# Patient Record
Sex: MALE | HISPANIC OR LATINO | Employment: UNEMPLOYED | URBAN - METROPOLITAN AREA
[De-identification: names, ages, dates, MRNs, and addresses within clinical notes are randomized per-mention and may not be internally consistent; named-entity substitution may affect disease eponyms.]

---

## 2023-09-23 ENCOUNTER — HOSPITAL ENCOUNTER (EMERGENCY)
Facility: HOSPITAL | Age: 33
Discharge: HOME | End: 2023-09-23
Attending: PEDIATRICS
Payer: MEDICARE

## 2023-09-23 ENCOUNTER — APPOINTMENT (OUTPATIENT)
Dept: GENERAL RADIOLOGY | Facility: HOSPITAL | Age: 33
End: 2023-09-23
Payer: MEDICARE

## 2023-09-23 VITALS
DIASTOLIC BLOOD PRESSURE: 74 MMHG | HEART RATE: 96 BPM | TEMPERATURE: 98.7 F | BODY MASS INDEX: 34.53 KG/M2 | SYSTOLIC BLOOD PRESSURE: 118 MMHG | RESPIRATION RATE: 16 BRPM | OXYGEN SATURATION: 95 % | WEIGHT: 220 LBS | HEIGHT: 67 IN

## 2023-09-23 DIAGNOSIS — L73.9 FOLLICULITIS: Primary | ICD-10-CM

## 2023-09-23 LAB
ALBUMIN SERPL-MCNC: 4.2 G/DL (ref 3.5–5.2)
ALBUMIN/GLOB SERPL: 1.2 G/DL
ALP SERPL-CCNC: 89 U/L (ref 39–117)
ALT SERPL W P-5'-P-CCNC: 22 U/L (ref 1–41)
ANION GAP SERPL CALCULATED.3IONS-SCNC: 9.8 MMOL/L (ref 5–15)
AST SERPL-CCNC: 14 U/L (ref 1–40)
BASOPHILS # BLD AUTO: 0.04 10*3/MM3 (ref 0–0.2)
BASOPHILS NFR BLD AUTO: 0.4 % (ref 0–1.5)
BILIRUB SERPL-MCNC: 0.3 MG/DL (ref 0–1.2)
BILIRUB UR QL STRIP: NEGATIVE
BUN SERPL-MCNC: 14 MG/DL (ref 6–20)
BUN/CREAT SERPL: 13.2 (ref 7–25)
CALCIUM SPEC-SCNC: 9.3 MG/DL (ref 8.6–10.5)
CHLORIDE SERPL-SCNC: 102 MMOL/L (ref 98–107)
CLARITY UR: CLEAR
CO2 SERPL-SCNC: 24.2 MMOL/L (ref 22–29)
COLOR UR: YELLOW
CREAT SERPL-MCNC: 1.06 MG/DL (ref 0.76–1.27)
DEPRECATED RDW RBC AUTO: 46.1 FL (ref 37–54)
EGFRCR SERPLBLD CKD-EPI 2021: 96.2 ML/MIN/1.73
EOSINOPHIL # BLD AUTO: 0.28 10*3/MM3 (ref 0–0.4)
EOSINOPHIL NFR BLD AUTO: 3 % (ref 0.3–6.2)
ERYTHROCYTE [DISTWIDTH] IN BLOOD BY AUTOMATED COUNT: 15.5 % (ref 12.3–15.4)
FLUAV SUBTYP SPEC NAA+PROBE: NOT DETECTED
FLUBV RNA ISLT QL NAA+PROBE: NOT DETECTED
GLOBULIN UR ELPH-MCNC: 3.5 GM/DL
GLUCOSE SERPL-MCNC: 134 MG/DL (ref 65–99)
GLUCOSE UR STRIP-MCNC: NEGATIVE MG/DL
HCT VFR BLD AUTO: 35.9 % (ref 37.5–51)
HGB BLD-MCNC: 11.8 G/DL (ref 13–17.7)
HGB UR QL STRIP.AUTO: ABNORMAL
IMM GRANULOCYTES # BLD AUTO: 0.02 10*3/MM3 (ref 0–0.05)
IMM GRANULOCYTES NFR BLD AUTO: 0.2 % (ref 0–0.5)
KETONES UR QL STRIP: NEGATIVE
LEUKOCYTE ESTERASE UR QL STRIP.AUTO: ABNORMAL
LYMPHOCYTES # BLD AUTO: 2.85 10*3/MM3 (ref 0.7–3.1)
LYMPHOCYTES NFR BLD AUTO: 31 % (ref 19.6–45.3)
MCH RBC QN AUTO: 27.3 PG (ref 26.6–33)
MCHC RBC AUTO-ENTMCNC: 32.9 G/DL (ref 31.5–35.7)
MCV RBC AUTO: 82.9 FL (ref 79–97)
MONOCYTES # BLD AUTO: 0.87 10*3/MM3 (ref 0.1–0.9)
MONOCYTES NFR BLD AUTO: 9.5 % (ref 5–12)
NEUTROPHILS NFR BLD AUTO: 5.13 10*3/MM3 (ref 1.7–7)
NEUTROPHILS NFR BLD AUTO: 55.9 % (ref 42.7–76)
NITRITE UR QL STRIP: NEGATIVE
PH UR STRIP.AUTO: 6 [PH] (ref 5–8)
PLATELET # BLD AUTO: 399 10*3/MM3 (ref 140–450)
PMV BLD AUTO: 8.7 FL (ref 6–12)
POTASSIUM SERPL-SCNC: 3.8 MMOL/L (ref 3.5–5.2)
PROT SERPL-MCNC: 7.7 G/DL (ref 6–8.5)
PROT UR QL STRIP: NEGATIVE
RBC # BLD AUTO: 4.33 10*6/MM3 (ref 4.14–5.8)
SARS-COV-2 RNA RESP QL NAA+PROBE: NOT DETECTED
SODIUM SERPL-SCNC: 136 MMOL/L (ref 136–145)
SP GR UR STRIP: 1.02 (ref 1–1.03)
STREP A PCR: NOT DETECTED
UROBILINOGEN UR QL STRIP: ABNORMAL
WBC NRBC COR # BLD: 9.19 10*3/MM3 (ref 3.4–10.8)

## 2023-09-23 PROCEDURE — 87651 STREP A DNA AMP PROBE: CPT | Performed by: PEDIATRICS

## 2023-09-23 PROCEDURE — 87636 SARSCOV2 & INF A&B AMP PRB: CPT | Performed by: PEDIATRICS

## 2023-09-23 PROCEDURE — 87591 N.GONORRHOEAE DNA AMP PROB: CPT | Performed by: PEDIATRICS

## 2023-09-23 PROCEDURE — 87661 TRICHOMONAS VAGINALIS AMPLIF: CPT | Performed by: PEDIATRICS

## 2023-09-23 PROCEDURE — 80053 COMPREHEN METABOLIC PANEL: CPT | Performed by: PEDIATRICS

## 2023-09-23 PROCEDURE — 81003 URINALYSIS AUTO W/O SCOPE: CPT | Performed by: PEDIATRICS

## 2023-09-23 PROCEDURE — 87186 SC STD MICRODIL/AGAR DIL: CPT | Performed by: PEDIATRICS

## 2023-09-23 PROCEDURE — 87205 SMEAR GRAM STAIN: CPT | Performed by: PEDIATRICS

## 2023-09-23 PROCEDURE — 87491 CHLMYD TRACH DNA AMP PROBE: CPT | Performed by: PEDIATRICS

## 2023-09-23 PROCEDURE — 87077 CULTURE AEROBIC IDENTIFY: CPT | Performed by: PEDIATRICS

## 2023-09-23 PROCEDURE — 71046 X-RAY EXAM CHEST 2 VIEWS: CPT

## 2023-09-23 PROCEDURE — 99283 EMERGENCY DEPT VISIT LOW MDM: CPT

## 2023-09-23 PROCEDURE — 87070 CULTURE OTHR SPECIMN AEROBIC: CPT | Performed by: PEDIATRICS

## 2023-09-23 PROCEDURE — 85025 COMPLETE CBC W/AUTO DIFF WBC: CPT | Performed by: PEDIATRICS

## 2023-09-23 PROCEDURE — 36415 COLL VENOUS BLD VENIPUNCTURE: CPT

## 2023-09-23 RX ORDER — DOXYCYCLINE 100 MG/1
100 CAPSULE ORAL 2 TIMES DAILY
Qty: 42 CAPSULE | Refills: 0 | Status: SHIPPED | OUTPATIENT
Start: 2023-09-23 | End: 2023-10-14

## 2023-09-23 RX ORDER — CEPHALEXIN 500 MG/1
500 CAPSULE ORAL 4 TIMES DAILY
Qty: 28 CAPSULE | Refills: 0 | Status: SHIPPED | OUTPATIENT
Start: 2023-09-23 | End: 2023-09-30

## 2023-09-23 NOTE — FSED PROVIDER NOTE
Emergency Medicine Evaluation Note  Subjective   History of Present Illness    HPI: Jake Sweeney is a 33 y.o. male who presents to the ED with right groin erythema with serosanguineous/bloody/intermittently purulent drainage.  Patient denies any fevers, chills, nausea or vomiting.  Past medical history as described below also including noninsulin-dependent diabetes.  Patient states lesion on glans of penis a few weeks prior, stated only lasted for a few days, resolved subsequently, states no current symptoms on penis or scrotum.  Patient is difficult historian, however given limited details, does not sound like chancre, patient describes more balanitis type symptoms, states resolved without treatment.  Patient denies any history of STDs or concerns for STDs.  No urinary or bowel symptoms.  No OTC medications prior to arrival. No other concomitant symptoms. No other known aggravating or alleviating factors.  Patient is predominant Wolof-speaking, some English, offered formal  however prefers to use family bedside.    Patient also states intermittent nonproductive cough, no dyspnea, no chest pain, no palpitations.    ROS  Review of Systems   Constitutional: Negative.  Negative for activity change, appetite change, chills, fatigue and fever.   HENT: Negative.     Eyes: Negative.    Respiratory: Negative.     Cardiovascular: Negative.    Gastrointestinal: Negative.    Endocrine: Negative.    Genitourinary: Negative.    Musculoskeletal: Negative.    Skin:  Positive for wound.   Allergic/Immunologic: Negative.    Neurological: Negative.    Hematological: Negative.    Psychiatric/Behavioral: Negative.     All other systems reviewed and are negative.    Previous History:  Past Medical History:   Diagnosis Date    Asthma     ROJAS (generalized anxiety disorder)      History reviewed. No pertinent surgical history.  Social History     Tobacco Use    Smoking status: Former     Types: Cigarettes      "Passive exposure: Past    Smokeless tobacco: Never   Vaping Use    Vaping Use: Never used   Substance Use Topics    Alcohol use: Not Currently    Drug use: Never     Family History   Problem Relation Age of Onset    Asthma Mother     Asthma Father     Asthma Brother      Allergies   Allergen Reactions    Penicillins Unknown - High Severity     Current Outpatient Medications   Medication Instructions    Accu-Chek Guide test strip     Accu-Chek Softclix Lancets lancets USE TO CHECK BLOOD SUGAR 3 TIMES DAILY    albuterol sulfate  (90 Base) MCG/ACT inhaler 2 puffs, Inhalation, Every 4 Hours PRN    Blood Glucose Monitoring Suppl (Accu-Chek Guide Me) w/Device kit USE TO CHECK BLOOD SUGAR 3 TIMES A DAY    budesonide-formoterol (SYMBICORT) 80-4.5 MCG/ACT inhaler 2 puffs, Inhalation, 2 Times Daily    cephalexin (KEFLEX) 500 mg, Oral, 4 Times Daily    Cholecalciferol (Vitamin D3) 1.25 MG (77612 UT) capsule TOME 1 C PSULA POR V A ORAL ONCE A WEEK    divalproex (DEPAKOTE) 250 MG DR tablet TAKE 1 TAB(S) ORALLY EVERY MORNING    doxycycline (MONODOX) 100 mg, Oral, 2 Times Daily    escitalopram (LEXAPRO) 10 MG tablet TOME SINTIA TABLETA TODOS LOS D AS    hydrOXYzine pamoate (VISTARIL) 50 MG capsule TOME SINTIA C PSULA CUATRO VECES AL D A CUANDO SEA NECESARIO    metFORMIN (GLUCOPHAGE) 500 MG tablet TAKE ONE TABLET BY MOUTH WITH BREAKFAST AND ONE TABLET BY MOUTH WITH DINNER    montelukast (SINGULAIR) 10 mg, Oral, Nightly    OLANZapine (zyPREXA) 5 MG tablet     pantoprazole (PROTONIX) 40 mg, Oral, Daily       Objective   Physical Exam  Patient Vitals for the past 24 hrs:   BP Temp Temp src Pulse Resp SpO2 Height Weight   09/23/23 0759 119/81 -- -- 92 -- 96 % -- --   09/23/23 0722 124/81 98.7 °F (37.1 °C) Oral 92 16 96 % 170.2 cm (67\") 99.8 kg (220 lb)     Physical Exam  Vitals and nursing note reviewed.   Constitutional:       General: He is not in acute distress.     Appearance: He is normal weight. He is not toxic-appearing. "   HENT:      Head: Normocephalic and atraumatic.      Nose: Nose normal. No congestion.      Mouth/Throat:      Mouth: Mucous membranes are moist.      Pharynx: Oropharynx is clear.   Eyes:      General:         Right eye: No discharge.         Left eye: No discharge.      Extraocular Movements: Extraocular movements intact.      Conjunctiva/sclera: Conjunctivae normal.   Cardiovascular:      Rate and Rhythm: Normal rate and regular rhythm.      Pulses: Normal pulses.      Heart sounds: Normal heart sounds. No murmur heard.  Pulmonary:      Effort: Pulmonary effort is normal. No respiratory distress.      Breath sounds: Normal breath sounds. No wheezing, rhonchi or rales.   Chest:      Chest wall: No tenderness.   Abdominal:      General: Abdomen is flat. There is no distension.      Palpations: Abdomen is soft.      Tenderness: There is no abdominal tenderness. There is no right CVA tenderness, left CVA tenderness, guarding or rebound.   Genitourinary:     Penis: Normal.       Testes: Normal.      Comments: No tenderness over penis or scrotum, no abnormalities, exam performed with tech lily at bedside.  Roughly 2 x 2 centimeter area of erythema with serosanguineous/scant purulent drainage in right groin, immediately inferior to ASIS near the lateral inguinal fold.  No palpable pocket of fluctuance.  No surrounding streaking, no crepitus, no bullae or dishwater discharge.  Musculoskeletal:         General: No swelling, tenderness, deformity or signs of injury. Normal range of motion.      Cervical back: Normal range of motion and neck supple.   Skin:     General: Skin is warm and dry.      Capillary Refill: Capillary refill takes less than 2 seconds.   Neurological:      General: No focal deficit present.      Mental Status: He is alert and oriented to person, place, and time.      Cranial Nerves: No cranial nerve deficit.      Sensory: No sensory deficit.      Motor: No weakness.      Coordination: Coordination  normal.      Gait: Gait normal.   Psychiatric:         Mood and Affect: Mood normal.         Behavior: Behavior normal.       Results  Labs Reviewed   COMPREHENSIVE METABOLIC PANEL - Abnormal; Notable for the following components:       Result Value    Glucose 134 (*)     All other components within normal limits    Narrative:     GFR Normal >60  Chronic Kidney Disease <60  Kidney Failure <15     URINALYSIS WITHOUT MICROSCOPIC (NO CULTURE) - Abnormal; Notable for the following components:    Blood, UA Moderate (2+) (*)     Leuk Esterase, UA Trace (*)     All other components within normal limits   CBC WITH AUTO DIFFERENTIAL - Abnormal; Notable for the following components:    Hemoglobin 11.8 (*)     Hematocrit 35.9 (*)     RDW 15.5 (*)     All other components within normal limits   COVID-19 AND FLU A/B, NP SWAB IN TRANSPORT MEDIA 8-12 HR TAT - Normal    Narrative:     Fact sheet for providers: https://www.fda.gov/media/880900/download    Fact sheet for patients: https://www.fda.gov/media/014086/download    Test performed by PCR.   RAPID STREP A SCREEN - Normal   CHLAMYDIA TRACHOMATIS, NEISSERIA GONORRHOEAE, TRICHOMONAS VAGINALIS, PCR   WOUND CULTURE   CBC AND DIFFERENTIAL    Narrative:     The following orders were created for panel order CBC & Differential.  Procedure                               Abnormality         Status                     ---------                               -----------         ------                     CBC Auto Differential[[id number]]        Abnormal            Final result                 Please view results for these tests on the individual orders.     XR Chest 2 View   Final Result   Impression:   No acute cardiopulmonary process.         Electronically Signed: Margi Reid MD     9/23/2023 7:59 AM EDT     Workstation ID: [id number]        The laboratory results, imaging results and other diagnostic exam results were reviewed in the EMR.     Procedures  Procedures    Medical Decision  Making    Patient presents to the ED as described above.  Vital signs stable and unremarkable.  Physical exam as described above.  Blood work without acute abnormality, urine with moderate blood, trace leukocyte esterase.  Patient without urinary symptoms.  Will send PCR for gonorrhea, chlamydia, trichomonas.  Exam and history consistent with folliculitis although given location of lesion, potential for early lymphogranuloma, given patient a difficult historian, will initiate course of Keflex for 7 days with doxycycline for 21 days for coverage of presumed folliculitis, will also cover any type of urinary tract/respiratory illness.  Prolonged course of doxycycline both for potential MRSA and coverage of lymphogranuloma venereum. Patient and family at bedside were informed of results.  They verbalized understanding and agreement with treatment care plan.  Discussed at length strict return/reevaluation precautions.  All questions answered.    Diagnosis  Final diagnoses:   Folliculitis       Disposition  ED Disposition       ED Disposition   Discharge    Condition   Stable    Comment   --             Lanette Jorge, APRN  [address] [place] [address] [id number] [phone]    Call in 3 days  contunity of care    PATIENT CONNECTION - [facility] [address]  [phone]  Call in 3 days  or primary care physician, for contunity of care    [place] [id number] [facility] [address] [phone]  Go to   As needed, If symptoms worsen

## 2023-09-25 LAB
BACTERIA SPEC AEROBE CULT: ABNORMAL
GRAM STN SPEC: ABNORMAL
GRAM STN SPEC: ABNORMAL

## 2023-09-26 LAB
C TRACH RRNA SPEC QL NAA+PROBE: NEGATIVE
N GONORRHOEA RRNA SPEC QL NAA+PROBE: NEGATIVE
T VAGINALIS RRNA SPEC QL NAA+PROBE: NEGATIVE

## 2023-10-18 ENCOUNTER — OFFICE VISIT (OUTPATIENT)
Dept: PULMONOLOGY | Facility: HOSPITAL | Age: 33
End: 2023-10-18
Payer: MEDICARE

## 2023-10-18 VITALS
HEIGHT: 67 IN | DIASTOLIC BLOOD PRESSURE: 78 MMHG | OXYGEN SATURATION: 94 % | RESPIRATION RATE: 14 BRPM | HEART RATE: 86 BPM | SYSTOLIC BLOOD PRESSURE: 117 MMHG | BODY MASS INDEX: 32.8 KG/M2 | WEIGHT: 209 LBS

## 2023-10-18 DIAGNOSIS — J30.9 ALLERGIC RHINITIS, UNSPECIFIED SEASONALITY, UNSPECIFIED TRIGGER: ICD-10-CM

## 2023-10-18 DIAGNOSIS — J45.30 MILD PERSISTENT ASTHMA WITHOUT COMPLICATION: ICD-10-CM

## 2023-10-18 DIAGNOSIS — G47.10 HYPERSOMNOLENCE: ICD-10-CM

## 2023-10-18 DIAGNOSIS — J34.2 NASAL SEPTAL DEVIATION: ICD-10-CM

## 2023-10-18 DIAGNOSIS — R06.83 SNORING: Primary | ICD-10-CM

## 2023-10-18 PROCEDURE — G0463 HOSPITAL OUTPT CLINIC VISIT: HCPCS

## 2023-10-18 RX ORDER — IBUPROFEN 600 MG/1
600 TABLET ORAL EVERY 6 HOURS PRN
COMMUNITY
Start: 2023-10-13

## 2023-10-18 NOTE — PROGRESS NOTES
HPI:    He has history of asthma and generalized anxiety disorder  He uses Singulair at nighttime and albuterol inhaler as needed, overall the wheezing or SOA is mostly at night  Reports nasal obstruction, he reports in [place] about 5 years ago he seen ENT for septal deviation, he gets frequent sore throat and recently was treated for pharyngitis  Mother reports snoring, interrupted sleep and EDS, he had home sleep test done at [facility] but due to lack of sleep there was insufficient data  C/o reflux  He is an ex-smoker    Past Medical History:   Diagnosis Date    Asthma     ROJAS (generalized anxiety disorder)         Current Outpatient Medications on File Prior to Visit   Medication Sig Dispense Refill    albuterol sulfate  (90 Base) MCG/ACT inhaler Inhale 2 puffs Every 4 (Four) Hours As Needed for Wheezing. 6.7 g 5    budesonide-formoterol (SYMBICORT) 80-4.5 MCG/ACT inhaler Inhale 2 puffs 2 (Two) Times a Day. 1 each 5    divalproex (DEPAKOTE) 250 MG DR tablet TAKE 1 TAB(S) ORALLY EVERY MORNING      escitalopram (LEXAPRO) 10 MG tablet TOME SINTIA TABLETA TODOS LOS D AS      hydrOXYzine pamoate (VISTARIL) 50 MG capsule TOME SINTIA C PSULA CUATRO VECES AL D A CUANDO SEA NECESARIO      ibuprofen (ADVIL,MOTRIN) 600 MG tablet Take 1 tablet by mouth Every 6 (Six) Hours As Needed.      OLANZapine (zyPREXA) 5 MG tablet       pantoprazole (Protonix) 40 MG EC tablet Take 1 tablet by mouth Daily. 30 tablet 5    Accu-Chek Guide test strip  (Patient not taking: Reported on 10/18/2023)      Accu-Chek Softclix Lancets lancets USE TO CHECK BLOOD SUGAR 3 TIMES DAILY (Patient not taking: Reported on 10/18/2023)      Blood Glucose Monitoring Suppl (Accu-Chek Guide Me) w/Device kit USE TO CHECK BLOOD SUGAR 3 TIMES A DAY (Patient not taking: Reported on 10/18/2023)      metFORMIN (GLUCOPHAGE) 500 MG tablet TAKE ONE TABLET BY MOUTH WITH BREAKFAST AND ONE TABLET BY MOUTH WITH DINNER (Patient not taking: Reported on 4/13/2023)       "montelukast (SINGULAIR) 10 MG tablet Take 1 tablet by mouth Every Night. (Patient not taking: Reported on 10/18/2023) 30 tablet 5    [DISCONTINUED] Cholecalciferol (Vitamin D3) 1.25 MG (00537 UT) capsule TOME 1 C PSULA POR V A ORAL ONCE A WEEK       No current facility-administered medications on file prior to visit.        Social History     Tobacco Use    Smoking status: Former     Types: Cigarettes     Passive exposure: Past    Smokeless tobacco: Never   Vaping Use    Vaping Use: Never used   Substance Use Topics    Alcohol use: Not Currently    Drug use: Never        Family History   Problem Relation Age of Onset    Asthma Mother     Asthma Father     Asthma Brother         Review of system:  Constitutional: Negative for chills, fever and malaise/fatigue.   HENT: Nasal congestion  Eyes: Negative.    Cardiovascular: Negative.    Respiratory: Positive for cough and shortness of breath.    Skin: Negative.    Musculoskeletal: Negative.    Gastrointestinal: Negative.    Genitourinary: Negative.    Neurological: Negative.    Psychiatric/Behavioral: Anxiety disorder    Physical exam:  Blood pressure 117/78, pulse 86, resp. rate 14, height 170.2 cm (67\"), weight 94.8 kg (209 lb), SpO2 94%.      General Appearance:  Alert   HEENT:  Normocephalic, without obvious abnormality, Conjunctiva/corneas clear,.   Nares normal, no drainage   , mild nasal septum deviation  Neck:  Supple, symmetrical, trachea midline. No JVD.  Lungs /Chest wall: Good air entry bilaterally without wheezing or rhonchi, respirations unlabored, symmetrical wall movement.     Heart:  Regular rate and rhythm, S1 S2 normal  Abdomen: Soft, non-tender, no masses, no organomegaly.    Extremities: No edema, no clubbing or cyanosis    XR Chest 2 View    Result Date: 9/23/2023  Impression: No acute cardiopulmonary process. Electronically Signed: Margi Reid MD  9/23/2023 7:59 AM EDT  Workstation ID: [id number]          Assessment and plan:  Symptoms of snoring, " interrupted sleep and excessive daytime sleepiness suggestive of MARTY probably exacerbated by the weight gain which the mother believes is a side effect of his psychiatric medications: We will repeat a home sleep test and if positive to order CPAP machine, weight loss would help the patient but any adjustment of the psychiatric medications will be deferred to the psychiatrist     Chronic nasal congestion and septum deviation, refer to ENT   asthma moderate persistent: Controlled with the Symbicort 2 puffs twice daily and Singulair 10 mg at at bedtime, albuterol as needed: Continue the same  GERD: protonex  Generalized anxiety: Treated by primary care physician  Former smoker     We will have the patient follow-up in 3 months    Patient is advised to stay up-to-date on immunizations for flu, pneumococcal and COVID-19

## 2023-10-23 ENCOUNTER — HOSPITAL ENCOUNTER (OUTPATIENT)
Dept: SLEEP MEDICINE | Facility: HOSPITAL | Age: 33
Discharge: HOME | End: 2023-10-23
Admitting: INTERNAL MEDICINE
Payer: MEDICARE

## 2023-10-23 DIAGNOSIS — G47.10 HYPERSOMNOLENCE: ICD-10-CM

## 2023-10-23 DIAGNOSIS — R06.83 SNORING: ICD-10-CM

## 2023-10-23 PROCEDURE — 95806 SLEEP STUDY UNATT&RESP EFFT: CPT

## 2024-01-31 ENCOUNTER — OFFICE VISIT (OUTPATIENT)
Dept: PULMONOLOGY | Facility: HOSPITAL | Age: 34
End: 2024-01-31
Payer: MEDICARE

## 2024-01-31 VITALS
DIASTOLIC BLOOD PRESSURE: 75 MMHG | SYSTOLIC BLOOD PRESSURE: 108 MMHG | HEIGHT: 67 IN | BODY MASS INDEX: 33.74 KG/M2 | WEIGHT: 215 LBS | HEART RATE: 99 BPM | OXYGEN SATURATION: 95 %

## 2024-01-31 DIAGNOSIS — J30.9 ALLERGIC RHINITIS, UNSPECIFIED SEASONALITY, UNSPECIFIED TRIGGER: ICD-10-CM

## 2024-01-31 DIAGNOSIS — J45.30 MILD PERSISTENT ASTHMA WITHOUT COMPLICATION: ICD-10-CM

## 2024-01-31 DIAGNOSIS — J34.2 NASAL SEPTAL DEVIATION: ICD-10-CM

## 2024-01-31 DIAGNOSIS — E66.9 OBESITY (BMI 30-39.9): ICD-10-CM

## 2024-01-31 DIAGNOSIS — G47.33 OSA (OBSTRUCTIVE SLEEP APNEA): Primary | ICD-10-CM

## 2024-01-31 PROCEDURE — G0463 HOSPITAL OUTPT CLINIC VISIT: HCPCS

## 2024-01-31 NOTE — PROGRESS NOTES
HPI:  He has history of asthma and generalized anxiety disorder  He uses Singulair at nighttime and albuterol inhaler as needed, overall the wheezing or SOA is mostly at night  Reports nasal obstruction, he reports in [place] about 5 years ago he seen ENT for septal deviation, he gets frequent sore throat and recently was treated for pharyngitis  Mother reports snoring, interrupted sleep and EDS, he had home sleep test done at [facility] but due to lack of sleep there was insufficient data  C/o reflux  He is an ex-smoker    Past Medical History:   Diagnosis Date    Asthma     ROJAS (generalized anxiety disorder)         Current Outpatient Medications on File Prior to Visit   Medication Sig Dispense Refill    Accu-Chek Guide test strip       Accu-Chek Softclix Lancets lancets       albuterol sulfate  (90 Base) MCG/ACT inhaler Inhale 2 puffs Every 4 (Four) Hours As Needed for Wheezing. 6.7 g 5    Blood Glucose Monitoring Suppl (Accu-Chek Guide Me) w/Device kit       budesonide-formoterol (SYMBICORT) 80-4.5 MCG/ACT inhaler Inhale 2 puffs 2 (Two) Times a Day. 1 each 5    divalproex (DEPAKOTE) 250 MG DR tablet TAKE 1 TAB(S) ORALLY EVERY MORNING      escitalopram (LEXAPRO) 10 MG tablet TOME SINTIA TABLETA TODOS LOS D AS      hydrOXYzine pamoate (VISTARIL) 50 MG capsule TOME SINTIA C PSULA CUATRO VECES AL D A CUANDO SEA NECESARIO      ibuprofen (ADVIL,MOTRIN) 600 MG tablet Take 1 tablet by mouth Every 6 (Six) Hours As Needed.      metFORMIN (GLUCOPHAGE) 500 MG tablet       montelukast (SINGULAIR) 10 MG tablet Take 1 tablet by mouth Every Night. 30 tablet 5    OLANZapine (zyPREXA) 5 MG tablet       pantoprazole (Protonix) 40 MG EC tablet Take 1 tablet by mouth Daily. 30 tablet 5     No current facility-administered medications on file prior to visit.        Social History     Tobacco Use    Smoking status: Former     Types: Cigarettes     Passive exposure: Past    Smokeless tobacco: Never   Vaping Use    Vaping Use:  "Never used   Substance Use Topics    Alcohol use: Not Currently    Drug use: Never        Family History   Problem Relation Age of Onset    Asthma Mother     Asthma Father     Asthma Brother         Review of system:  Constitutional: Negative for chills, fever and malaise/fatigue.   HENT: Negative.    Eyes: Negative.    Cardiovascular: Negative.    Respiratory: negative for cough and shortness of breath.    Skin: Negative.    Musculoskeletal: Negative.    Gastrointestinal: Negative.    Genitourinary: Negative.    Neurological: Negative.    Psychiatric/Behavioral: Negative.    Physical exam:  Blood pressure 108/75, pulse 99, height 170.2 cm (67\"), weight 97.5 kg (215 lb), SpO2 95%.    General Appearance:  Alert   HEENT:  Normocephalic, without obvious abnormality, Conjunctiva/corneas clear,.   Nares normal, no drainage     Neck:  Supple, symmetrical, trachea midline. No JVD.  Lungs /Chest wall:   good air entry Bilaterlly, respirations unlabored, symmetrical wall movement.     Heart:  Regular rate and rhythm, S1 S2 normal  Abdomen: Soft, non-tender, no masses, no organomegaly.    Extremities: No edema, no clubbing or cyanosis    No radiology results for the last 90 days.         Assessment and plan:  Mild MARTY on home sleep study October 2023 AHI 5.1, symptoms of snoring, interrupted sleep and excessive daytime sleepiness, MARTY probably exacerbated by the weight gain which the mother believes is a side effect of his psychiatric medications:  Order auto CPAP 5-15 cm H2O  Discussed with patient: SAFETY DRIVING  ADEQUATE SLEEP HYGEINE  DISCUSSED CARDIOVASCULAR & METABOLIC SIDE EFFECTS OF UNTREATED MARTY  PATIENT HAS NASAL DRYNESS AND WILL NEED HEATED HUMIDITY WITH PAP      weight loss would help the patient but any adjustment of the psychiatric medications will be deferred to the psychiatrist      Chronic nasal congestion and septum deviation, refer to ENT   asthma moderate persistent: Controlled with the Symbicort 2 puffs " twice daily and Singulair 10 mg at at bedtime, albuterol as needed: Continue the same  GERD: protonex  Generalized anxiety: Treated by primary care physician  Former smoker     We will have the patient follow-up in 3 months      service was used to communicate clearly with the patient and his mother

## 2024-05-13 ENCOUNTER — OFFICE VISIT (OUTPATIENT)
Dept: PULMONOLOGY | Facility: HOSPITAL | Age: 34
End: 2024-05-13
Payer: MEDICARE

## 2024-05-13 VITALS
BODY MASS INDEX: 33.9 KG/M2 | DIASTOLIC BLOOD PRESSURE: 68 MMHG | HEART RATE: 92 BPM | HEIGHT: 67 IN | RESPIRATION RATE: 16 BRPM | SYSTOLIC BLOOD PRESSURE: 114 MMHG | WEIGHT: 216 LBS | OXYGEN SATURATION: 96 %

## 2024-05-13 DIAGNOSIS — R09.81 NASAL CONGESTION: ICD-10-CM

## 2024-05-13 DIAGNOSIS — J45.40 MODERATE PERSISTENT ASTHMA WITHOUT COMPLICATION: ICD-10-CM

## 2024-05-13 DIAGNOSIS — G47.33 OSA (OBSTRUCTIVE SLEEP APNEA): Primary | ICD-10-CM

## 2024-05-13 PROCEDURE — G0463 HOSPITAL OUTPT CLINIC VISIT: HCPCS

## 2024-05-13 NOTE — PROGRESS NOTES
HPI:  He has history of asthma and generalized anxiety disorder  He uses Singulair at nighttime and albuterol inhaler as needed, overall the wheezing or SOA is mostly at night  Reports nasal obstruction, he reports in [place] about 5 years ago he seen ENT for septal deviation, he gets frequent sore throat and recently was treated for pharyngitis  Mother reports snoring, interrupted sleep and EDS, he is trying to use the CPAP machine but feels the pressure is too high  C/o reflux  He is an ex-smoker    Past Medical History:   Diagnosis Date    Asthma     ROJAS (generalized anxiety disorder)         Current Outpatient Medications on File Prior to Visit   Medication Sig Dispense Refill    Accu-Chek Guide test strip       Accu-Chek Softclix Lancets lancets       albuterol sulfate  (90 Base) MCG/ACT inhaler Inhale 2 puffs Every 4 (Four) Hours As Needed for Wheezing. 6.7 g 5    Blood Glucose Monitoring Suppl (Accu-Chek Guide Me) w/Device kit       budesonide-formoterol (SYMBICORT) 80-4.5 MCG/ACT inhaler Inhale 2 puffs 2 (Two) Times a Day. 1 each 5    divalproex (DEPAKOTE) 250 MG DR tablet TAKE 1 TAB(S) ORALLY EVERY MORNING      escitalopram (LEXAPRO) 10 MG tablet TOME SINTIA TABLETA TODOS LOS D AS      hydrOXYzine pamoate (VISTARIL) 50 MG capsule TOME SINTIA C PSULA CUATRO VECES AL D A CUANDO SEA NECESARIO      ibuprofen (ADVIL,MOTRIN) 600 MG tablet Take 1 tablet by mouth Every 6 (Six) Hours As Needed.      metFORMIN (GLUCOPHAGE) 500 MG tablet       montelukast (SINGULAIR) 10 MG tablet Take 1 tablet by mouth Every Night. 30 tablet 5    OLANZapine (zyPREXA) 5 MG tablet       pantoprazole (Protonix) 40 MG EC tablet Take 1 tablet by mouth Daily. 30 tablet 5     No current facility-administered medications on file prior to visit.        Social History     Tobacco Use    Smoking status: Former     Types: Cigarettes     Passive exposure: Past    Smokeless tobacco: Never   Vaping Use    Vaping status: Never Used   Substance Use  "Topics    Alcohol use: Not Currently    Drug use: Never        Family History   Problem Relation Age of Onset    Asthma Mother     Asthma Father     Asthma Brother         Review of system:  Constitutional: Negative for chills, fever and malaise/fatigue.   HENT: Chronic nasal congestion  Eyes: Negative.    Cardiovascular: Negative.    Respiratory: negative for cough and shortness of breath.    Skin: Negative.    Musculoskeletal: Negative.    Gastrointestinal: Negative.    Genitourinary: Negative.    Neurological: Negative.        Physical exam:  Blood pressure 114/68, pulse 92, resp. rate 16, height 170.2 cm (67\"), weight 98 kg (216 lb), SpO2 96%.    General Appearance:  Alert   HEENT:  Normocephalic, without obvious abnormality, Conjunctiva/corneas clear,.   Nares normal, no drainage     Neck:  Supple, symmetrical, trachea midline. No JVD.  Lungs /Chest wall:   good air entry Bilaterlly, respirations unlabored, symmetrical wall movement.     Heart:  Regular rate and rhythm, S1 S2 normal  Abdomen: Soft, non-tender, no masses, no organomegaly.    Extremities: No edema, no clubbing or cyanosis    No radiology results for the last 90 days.         Assessment and plan:  Mild MARTY on home sleep study October 2023 AHI 5.1, symptoms of snoring, interrupted sleep and excessive daytime sleepiness, MARTY probably exacerbated by the weight gain which the mother believes is a side effect of his psychiatric medications:  Adjust auto CPAP 5-8 cm H2O,refer to a sleep dentists for a mandibular advancement device  Discussed with patient: SAFETY DRIVING  ADEQUATE SLEEP HYGEINE  DISCUSSED CARDIOVASCULAR & METABOLIC SIDE EFFECTS OF UNTREATED MARTY  PATIENT HAS NASAL DRYNESS AND WILL NEED HEATED HUMIDITY WITH PAP        weight loss would help the patient but any adjustment of the psychiatric medications will be deferred to the psychiatrist      Chronic nasal congestion and septum deviation, he has appointment with a ENT     asthma moderate " persistent: Controlled with the Symbicort 2 puffs twice daily and Singulair 10 mg at at bedtime, albuterol as needed: Continue the same  GERD: protonex  Generalized anxiety: Treated by primary care physician  Former smoker    Follow-up in 6-month  I personally reviewed the latest radiological study  Patient is advised to stay up-to-date on immunizations for flu, pneumococcal and COVID-19

## 2024-05-24 ENCOUNTER — TELEPHONE (OUTPATIENT)
Dept: PULMONOLOGY | Facility: HOSPITAL | Age: 34
End: 2024-05-24
Payer: MEDICARE

## 2024-05-24 NOTE — TELEPHONE ENCOUNTER
Received a call through [facility] that the pt is still having issues with the pressure. They had updated the pressure on 5/16/24. Please advise?    FYI dental referral has been faxed to [facility]

## 2024-06-03 NOTE — TELEPHONE ENCOUNTER
Dr. Galeas advised pt to discontinue using CPAP. Through the  service we left a vm with this information.

## 2024-10-31 ENCOUNTER — OFFICE VISIT (OUTPATIENT)
Dept: PULMONOLOGY | Facility: HOSPITAL | Age: 34
End: 2024-10-31
Payer: MEDICARE

## 2024-10-31 VITALS
DIASTOLIC BLOOD PRESSURE: 82 MMHG | RESPIRATION RATE: 14 BRPM | HEART RATE: 82 BPM | OXYGEN SATURATION: 98 % | HEIGHT: 67 IN | BODY MASS INDEX: 35.16 KG/M2 | SYSTOLIC BLOOD PRESSURE: 123 MMHG | WEIGHT: 224 LBS

## 2024-10-31 DIAGNOSIS — E66.9 OBESITY (BMI 30-39.9): ICD-10-CM

## 2024-10-31 DIAGNOSIS — J30.9 ALLERGIC RHINITIS, UNSPECIFIED SEASONALITY, UNSPECIFIED TRIGGER: ICD-10-CM

## 2024-10-31 DIAGNOSIS — G47.33 OSA (OBSTRUCTIVE SLEEP APNEA): Primary | ICD-10-CM

## 2024-10-31 DIAGNOSIS — J45.40 MODERATE PERSISTENT ASTHMA WITHOUT COMPLICATION: ICD-10-CM

## 2024-10-31 PROCEDURE — G0463 HOSPITAL OUTPT CLINIC VISIT: HCPCS

## 2024-10-31 RX ORDER — BUDESONIDE AND FORMOTEROL FUMARATE DIHYDRATE 80; 4.5 UG/1; UG/1
2 AEROSOL RESPIRATORY (INHALATION) 2 TIMES DAILY
Qty: 1 EACH | Refills: 5 | Status: SHIPPED | OUTPATIENT
Start: 2024-10-31

## 2024-10-31 RX ORDER — ALBUTEROL SULFATE 90 UG/1
2 INHALANT RESPIRATORY (INHALATION) EVERY 4 HOURS PRN
Qty: 6.7 G | Refills: 5 | Status: SHIPPED | OUTPATIENT
Start: 2024-10-31

## 2024-10-31 RX ORDER — ESCITALOPRAM OXALATE 5 MG/1
1 TABLET ORAL DAILY
COMMUNITY
Start: 2024-10-25

## 2024-10-31 NOTE — PROGRESS NOTES
HPI:  Patient is here for pulmonary and sleep follow-up.  He has history of asthma and generalized anxiety disorder  He uses Singulair at nighttime and albuterol inhaler as needed, overall the wheezing or SOA is mostly at night  Reports nasal obstruction, he reports in [place] about 5 years ago he seen ENT for septal deviation, he gets frequent sore throat and recently was treated for pharyngitis  Mother reports snoring, interrupted sleep and EDS, he had difficulty using the CPAP machine and feels the pressure is too high  C/o reflux but improved with treatment  He is an ex-smoker  Past Medical History:   Diagnosis Date    Asthma     ROJAS (generalized anxiety disorder)     MARTY (obstructive sleep apnea)         Current Outpatient Medications on File Prior to Visit   Medication Sig Dispense Refill    Accu-Chek Guide test strip       Accu-Chek Softclix Lancets lancets       albuterol sulfate  (90 Base) MCG/ACT inhaler Inhale 2 puffs Every 4 (Four) Hours As Needed for Wheezing. 6.7 g 5    Blood Glucose Monitoring Suppl (Accu-Chek Guide Me) w/Device kit       budesonide-formoterol (SYMBICORT) 80-4.5 MCG/ACT inhaler Inhale 2 puffs 2 (Two) Times a Day. 1 each 5    divalproex (DEPAKOTE) 250 MG DR tablet TAKE 1 TAB(S) ORALLY EVERY MORNING      escitalopram (LEXAPRO) 5 MG tablet Take 1 tablet by mouth Daily.      hydrOXYzine pamoate (VISTARIL) 50 MG capsule TOME SINTIA C PSULA CUATRO VECES AL D A CUANDO SEA NECESARIO      ibuprofen (ADVIL,MOTRIN) 600 MG tablet Take 1 tablet by mouth Every 6 (Six) Hours As Needed.      metFORMIN (GLUCOPHAGE) 500 MG tablet       montelukast (SINGULAIR) 10 MG tablet Take 1 tablet by mouth Every Night. 30 tablet 5    OLANZapine (zyPREXA) 5 MG tablet       pantoprazole (Protonix) 40 MG EC tablet Take 1 tablet by mouth Daily. 30 tablet 5    [DISCONTINUED] escitalopram (LEXAPRO) 10 MG tablet TOME SINTIA TABLETA TODOS LOS D AS       No current facility-administered medications on file prior to visit.  "       Social History     Tobacco Use    Smoking status: Former     Types: Cigarettes     Passive exposure: Past    Smokeless tobacco: Never   Vaping Use    Vaping status: Never Used   Substance Use Topics    Alcohol use: Not Currently    Drug use: Never        Family History   Problem Relation Age of Onset    Asthma Mother     Asthma Father     Asthma Brother         Review of system:  Constitutional: Negative for chills, fever and malaise/fatigue.   HENT: Negative.    Eyes: Negative.    Cardiovascular: Negative.    Respiratory: Intermittent episodes of shortness of breath.    Skin: Negative.    Musculoskeletal: Negative.    Gastrointestinal: Negative.    Genitourinary: Negative.    Neurological: Negative.    Psychiatric/Behavioral: Negative.    Physical exam:  Blood pressure 123/82, pulse 82, resp. rate 14, height 170.2 cm (67\"), weight 102 kg (224 lb), SpO2 98%.    General Appearance:  Alert   HEENT:  Normocephalic, without obvious abnormality, Conjunctiva/corneas clear,.   Nares normal, no drainage     Neck:  Supple, symmetrical, trachea midline. No JVD.  Lungs /Chest wall:   good air entry Bilaterlly, respirations unlabored, symmetrical wall movement.     Heart:  Regular rate and rhythm, S1 S2 normal  Abdomen: Soft, non-tender, no masses, no organomegaly.    Extremities: No edema, no clubbing or cyanosis    No radiology results for the last 90 days.         Assessment and plan:  Mild MARTY on home sleep study October 2023 AHI 5.1, symptoms of snoring, interrupted sleep and excessive daytime sleepiness, MARTY probably exacerbated by the weight gain which the mother believes is a side effect of his psychiatric medications:  Adjust auto CPAP 5-8 cm H2O,refer to a sleep dentists for a mandibular advancement device  Discussed with patient: SAFETY DRIVING  ADEQUATE SLEEP HYGEINE  DISCUSSED CARDIOVASCULAR & METABOLIC SIDE EFFECTS OF UNTREATED MARTY  PATIENT HAS NASAL DRYNESS AND WILL NEED HEATED HUMIDITY WITH PAP      "   weight loss would help the patient but any adjustment of the psychiatric medications will be deferred to the psychiatrist      Chronic nasal congestion and septum deviation, he has appointment with a ENT      asthma moderate persistent: Controlled with the Symbicort 2 puffs twice daily and Singulair 10 mg at at bedtime, albuterol as needed: Continue the same  GERD: protonex  Generalized anxiety: Treated by primary care physician  Former smoker     Follow-up in 6-month  I personally reviewed the latest radiological study  Patient is advised to stay up-to-date on immunizations for flu, pneumococcal and COVID-19